# Patient Record
Sex: MALE | Race: WHITE | NOT HISPANIC OR LATINO | Employment: OTHER | URBAN - METROPOLITAN AREA
[De-identification: names, ages, dates, MRNs, and addresses within clinical notes are randomized per-mention and may not be internally consistent; named-entity substitution may affect disease eponyms.]

---

## 2017-10-23 ENCOUNTER — HISTORICAL (OUTPATIENT)
Dept: ADMINISTRATIVE | Facility: HOSPITAL | Age: 62
End: 2017-10-23

## 2017-11-02 ENCOUNTER — HISTORICAL (OUTPATIENT)
Dept: RADIOLOGY | Facility: HOSPITAL | Age: 62
End: 2017-11-02

## 2017-11-13 ENCOUNTER — HISTORICAL (OUTPATIENT)
Dept: PREADMISSION TESTING | Facility: HOSPITAL | Age: 62
End: 2017-11-13

## 2017-11-13 LAB
ABS NEUT (OLG): 8.21 X10(3)/MCL (ref 2.1–9.2)
ANISOCYTOSIS BLD QL SMEAR: 1
BUN SERPL-MCNC: 16 MG/DL (ref 7–18)
CALCIUM SERPL-MCNC: 9.6 MG/DL (ref 8.5–10.1)
CHLORIDE SERPL-SCNC: 100 MMOL/L (ref 98–107)
CO2 SERPL-SCNC: 27 MMOL/L (ref 21–32)
CREAT SERPL-MCNC: 0.8 MG/DL (ref 0.7–1.3)
EOSINOPHIL NFR BLD MANUAL: 1 % (ref 0–8)
ERYTHROCYTE [DISTWIDTH] IN BLOOD BY AUTOMATED COUNT: 15.6 % (ref 11.5–17)
GLUCOSE SERPL-MCNC: 85 MG/DL (ref 74–106)
HCT VFR BLD AUTO: 43.2 % (ref 42–52)
HGB BLD-MCNC: 13.6 GM/DL (ref 14–18)
LYMPHOCYTES NFR BLD MANUAL: 20 % (ref 13–40)
LYMPHOCYTES NFR BLD MANUAL: 21 %
MACROCYTES BLD QL SMEAR: 1
MCH RBC QN AUTO: 28.9 PG (ref 27–31)
MCHC RBC AUTO-ENTMCNC: 31.5 GM/DL (ref 33–36)
MCV RBC AUTO: 91.7 FL (ref 80–94)
MONOCYTES NFR BLD MANUAL: 10 % (ref 2–11)
NEUTROPHILS NFR BLD MANUAL: 48 % (ref 47–80)
PLATELET # BLD AUTO: 291 X10(3)/MCL (ref 130–400)
PLATELET # BLD EST: NORMAL 10*3/UL
PMV BLD AUTO: 12.4 FL (ref 7.4–10.4)
POTASSIUM SERPL-SCNC: 4.3 MMOL/L (ref 3.5–5.1)
RBC # BLD AUTO: 4.71 X10(6)/MCL (ref 4.7–6.1)
SODIUM SERPL-SCNC: 138 MMOL/L (ref 136–145)
WBC # SPEC AUTO: 17.3 X10(3)/MCL (ref 4.5–11.5)

## 2018-02-26 ENCOUNTER — HISTORICAL (OUTPATIENT)
Dept: PREADMISSION TESTING | Facility: HOSPITAL | Age: 63
End: 2018-02-26

## 2018-02-26 ENCOUNTER — HISTORICAL (OUTPATIENT)
Dept: LAB | Facility: HOSPITAL | Age: 63
End: 2018-02-26

## 2018-02-26 LAB
ABS NEUT (OLG): 5.12 X10(3)/MCL (ref 2.1–9.2)
BASOPHILS # BLD AUTO: 0.1 X10(3)/MCL (ref 0–0.2)
BASOPHILS NFR BLD AUTO: 1 %
CRP SERPL HS-MCNC: 4.9 MG/L (ref 0–3)
EOSINOPHIL # BLD AUTO: 0.3 X10(3)/MCL (ref 0–0.9)
EOSINOPHIL NFR BLD AUTO: 3 %
ERYTHROCYTE [DISTWIDTH] IN BLOOD BY AUTOMATED COUNT: 14.6 % (ref 11.5–17)
ERYTHROCYTE [SEDIMENTATION RATE] IN BLOOD: 13 MM/HR (ref 0–15)
HCT VFR BLD AUTO: 42.4 % (ref 42–52)
HGB BLD-MCNC: 13.2 GM/DL (ref 14–18)
LYMPHOCYTES # BLD AUTO: 5 X10(3)/MCL (ref 0.6–4.6)
LYMPHOCYTES NFR BLD AUTO: 43 %
MCH RBC QN AUTO: 31 PG (ref 27–31)
MCHC RBC AUTO-ENTMCNC: 31.1 GM/DL (ref 33–36)
MCV RBC AUTO: 99.5 FL (ref 80–94)
MONOCYTES # BLD AUTO: 1.1 X10(3)/MCL (ref 0.1–1.3)
MONOCYTES NFR BLD AUTO: 9 %
NEUTROPHILS # BLD AUTO: 5.12 X10(3)/MCL (ref 1.4–7.9)
NEUTROPHILS NFR BLD AUTO: 44 %
PLATELET # BLD AUTO: 348 X10(3)/MCL (ref 130–400)
PMV BLD AUTO: 10.6 FL (ref 9.4–12.4)
RBC # BLD AUTO: 4.26 X10(6)/MCL (ref 4.7–6.1)
WBC # SPEC AUTO: 11.6 X10(3)/MCL (ref 4.5–11.5)

## 2018-04-02 ENCOUNTER — HISTORICAL (OUTPATIENT)
Dept: ADMINISTRATIVE | Facility: HOSPITAL | Age: 63
End: 2018-04-02

## 2018-04-04 ENCOUNTER — HISTORICAL (OUTPATIENT)
Dept: RADIOLOGY | Facility: HOSPITAL | Age: 63
End: 2018-04-04

## 2018-07-19 ENCOUNTER — HISTORICAL (OUTPATIENT)
Dept: ADMINISTRATIVE | Facility: HOSPITAL | Age: 63
End: 2018-07-19

## 2019-03-28 ENCOUNTER — HISTORICAL (OUTPATIENT)
Dept: ADMINISTRATIVE | Facility: HOSPITAL | Age: 64
End: 2019-03-28

## 2019-04-17 ENCOUNTER — HISTORICAL (OUTPATIENT)
Dept: RADIOLOGY | Facility: HOSPITAL | Age: 64
End: 2019-04-17

## 2019-07-10 ENCOUNTER — HISTORICAL (OUTPATIENT)
Dept: ADMINISTRATIVE | Facility: HOSPITAL | Age: 64
End: 2019-07-10

## 2019-08-09 ENCOUNTER — HISTORICAL (OUTPATIENT)
Dept: ADMINISTRATIVE | Facility: HOSPITAL | Age: 64
End: 2019-08-09

## 2019-09-23 ENCOUNTER — HISTORICAL (OUTPATIENT)
Dept: ADMINISTRATIVE | Facility: HOSPITAL | Age: 64
End: 2019-09-23

## 2022-04-10 ENCOUNTER — HISTORICAL (OUTPATIENT)
Dept: ADMINISTRATIVE | Facility: HOSPITAL | Age: 67
End: 2022-04-10

## 2022-04-28 VITALS
BODY MASS INDEX: 41.34 KG/M2 | WEIGHT: 224.63 LBS | SYSTOLIC BLOOD PRESSURE: 133 MMHG | DIASTOLIC BLOOD PRESSURE: 77 MMHG | HEIGHT: 62 IN

## 2022-05-04 NOTE — HISTORICAL [facility]
This is a historical note converted from [place]. Formatting and pictures may have been removed.  Please reference [place] for original formatting and attached multimedia. Chief Complaint  6 week s/p Rt Rev Total Shoulder Arthroscopy, Sx-5/28/19--Gl--8/26/19. ?Pt states his shoulder is doing better since the last appt  History of Present Illness  5/20/2019: Right reverse total shoulder arthroplasty  ?   He returns today. His pains under good control. 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  HT:?157?cm? WT:?101.9?kg? BMI:?41.34?  Incision healed. ?Forward flexion 160 degrees, abduction?90 degrees, external rotation 20 degrees  Assessment/Plan  S/p reverse total shoulder arthroplasty?Z96.619  Doing well status post above.? He needs to be mindful of his weightbearing. ?Okay for range of motion.? I will see him back in 6 weeks?for range of motion check and a D MAGAN score  Ordered:  Clinic Follow up, *Est. 08/21/19 3:00:00 CDT, Order for future visit, S/p reverse total shoulder arthroplasty, [facility]  Post-Op follow-up visit [id number] PC, S/p reverse total shoulder arthroplasty, [facility], 07/10/19 8:58:00 CDT  XR Shoulder Right Minimum 2 Views, Routine, 07/10/19 8:41:00 CDT, Other (please specify), None, Patient Bed, Patient Has IV?, Patient on Oxygen?, Rad Type, S/p reverse total shoulder arthroplasty, Not Scheduled, 07/10/19 8:41:00 CDT  ?  Referrals  Clinic Follow up, *Est. 08/21/19 3:00:00 CDT, Order for future visit, S/p reverse total shoulder arthroplasty, [facility]   Problem List/Past Medical History  Ongoing  Apnea, sleep  DJD (degenerative joint disease)  Expected difficult intubation  History of methicillin resistant Staphylococcus aureus infection  Hypothyroidism  Neuropathy  Obesity  S/p reverse total shoulder arthroplasty  Sinusitis  Spinal stenosis  Thyroid disease  Under care of pain management specialist  Historical  Home oxygen  supply  Kidney stone  Leaky pulmonary heart valve  Low oxygen saturation  Procedure/Surgical History  Replacement of Right Shoulder Joint with Reverse Ball and Socket Synthetic Substitute, Open Approach (05/28/2019)  Total Reverse Shoulder (Right) (05/28/2019)  Injection procedure for shoulder arthrography or enhanced CT/MRI shoulder arthrography (04/17/2019)  gastric sleeve (03/20/2017)  Application of external fixator device, other (12/05/2014)  XLIF + removal of lower rib + posterior percutaneous screws w/bar & sonal (12/05/2014)  XLIF + removal of lower rib + posterior percutaneous screws w/bar & sonal (12/05/2014)  INSERTION OF INTERBODY SPINAL FUSION DEVICE (12/05/2014)  XLIF + removal of lower rib + posterior percutaneous screws w/bar & sonal (12/05/2014)  Xtreme Lateral Interbody Fusion W/Prone Instrumentation (Left) (12/05/2014)  Splenectomy (1969)  Hernia repair  hip arthroproplasty  History of back surgery  Lithotripsy  multiple lower back surgeries  orif femur  spinal fusion  Total hip replacement  total knee arthroplasty  Total knee replacement   Medications  acetaminophen-oxycodone 325 mg-10 mg oral tablet  aspirin 81 mg oral Delayed Release (EC) tablet, 81 mg= 1 tab(s), Oral, BID  DIAZepam 5 mg oral tablet  diclofenac 1% topical gel, 2 gm, TOP, QID  gabapentin 400 mg oral capsule, 400 mg= 1 cap(s), Oral, QID  levothyroxine 200 mcg (0.2 mg) oral tablet, 200 mcg= 1 tab(s), Oral, Daily  methadone 10 mg oral tablet, 10 mg= 1 tab(s), Oral, BID  mometasone 0.1% topical ointment, 1 manfred, TOP, Daily  traMADol 50 mg oral tablet  Allergies  morphine?(nausea, itching)  vancomycin?(Agitated)  Social History  Abuse/Neglect  No, No, Yes, 07/10/2019  Alcohol - Denies Alcohol Use, 12/05/2014  Current, 10/23/2017  Employment/School  Unemployed, Work/School description: SSI., 05/16/2019  Exercise  Home/Environment  Lives with Spouse. Living situation: Home with assistance. Home equipment: Walker/Cane.,  05/16/2019  Nutrition/Health  Regular, 05/16/2019  Sexual  Sexually active: Yes., 05/16/2019  Substance Use - Denies Substance Abuse, 12/05/2014  Drug use interferes with work/home: No. Household substance abuse concerns: No., 06/12/2019  Tobacco - Low Risk, 12/05/2014  Former smoker, quit more than 30 days ago, No, 07/10/2019  Family History  DVT - Deep vein thrombosis: Father.  Health Maintenance  Health Maintenance  ???Pending?(in the next year)  ??? ??OverDue  ??? ? ? ?Diabetes Screening due??and every?  ??? ? ? ?Alcohol Misuse Screening due??01/01/19??and every 1??year(s)  ??? ? ? ?Depression Screening due??04/09/19??and every 1??year(s)  ??? ??Due?  ??? ? ? ?ADL Screening due??07/10/19??and every 1??year(s)  ??? ? ? ?COPD Maintenance-Spirometry due??07/10/19??and every?  ??? ? ? ?Colorectal Screening due??07/10/19??and every?  ??? ? ? ?Lipid Screening due??07/10/19??and every?  ??? ? ? ?Tetanus Vaccine due??07/10/19??and every 10??year(s)  ??? ? ? ?Zoster Vaccine due??07/10/19??and every 100??year(s)  ??? ??Due In Future?  ??? ? ? ?Obesity Screening not due until??01/01/20??and every 1??year(s)  ??? ? ? ?Aspirin Therapy for CVD Prevention not due until??05/30/20??and every 1??year(s)  ??? ? ? ?Blood Pressure Screening not due until??06/11/20??and every 1??year(s)  ??? ? ? ?Body Mass Index Check not due until??06/11/20??and every 1??year(s)  ???Satisfied?(in the past 1 year)  ??? ??Satisfied?  ??? ? ? ?Aspirin Therapy for CVD Prevention on??05/30/19.??Satisfied by Hernando GALLO MD, Slick ESPINAL  ??? ? ? ?Blood Pressure Screening on??07/10/19.??Satisfied by Clarissa Weiner  ??? ? ? ?Body Mass Index Check on??07/10/19.??Satisfied by Clarissa Weiner  ??? ? ? ?COPD Maintenance-Spirometry on??05/30/19.??Satisfied by Gabriela Parker RN  ??? ? ? ?Diabetes Screening on??05/30/19.??Satisfied by Lucho Alvarado  ??? ? ? ?Obesity Screening on??07/10/19.??Satisfied by Clarissa Weiner  ?  Diagnostic  Results  Shoulder radiographs show appropriate implant position

## 2022-05-04 NOTE — HISTORICAL [facility]
This is a historical note converted from [place]. Formatting and pictures may have been removed.  Please reference [place] for original formatting and attached multimedia. Chief Complaint  right shoulder, wants to discuss surgery  History of Present Illness  Patient returns today for repeat exam. ?Patient?continues to have some right shoulder pain. ?He states he would like to talk about his neck pain. ?Patient states he has had?5 lumbar surgeries?in the past. ?He has had 4 lumbar surgeries by , 1 by Dr. Nicole. ?He states more recently the last 3-4 months he has noticed some increasing pain in both shoulders as well as intermittent numbness and tingling?from his neck down to his shoulders. ?He does have a history of arthritis. ?He denies any specific surgery on his cervical spine. ?He denies any trauma.? He would like his cervical spine evaluated today.  Physical Exam  Vitals & Measurements  HR:?64(Peripheral)? BP:?157/86?  HT:?161?cm? WT:?113.9?kg?  Examination of the cervical spine he is tender to palpation about the paraspinal muscles on the left and right side he is nontender over the specific spinous processes. ?There is no step-off or crepitus. ?He is able to flex 30? extend 10? and rotate 25?. ?He has 5 out of 5 strength from C5 through T1 states intact light touch negative Hoffmans 2+ reflexes at C5 and?C7 bilaterally.? He is neurovascular intact distally. ?X-rays 3 views of the cervical spine demonstrate severe degenerative changes?at the lower cervical spine levels?with loss of joint space.  Assessment/Plan  1.?Osteoarthritis of right shoulder  ? At this time we discussed his physical exam and x-ray findings. ?Patient has intermittent radiculopathy to both shoulders as well as degenerative changes. ?I am concerned for a herniated disc and?stenosis. ?We will proceed with a CT scan of the cervical spine. ?He states he is not able to obtain an MRI?given the length of an MRI and unable to lay down.?  Initially we will start with a CT scan for further evaluation of his cervical spine, I would like to see him back later this week for his results.  Ordered:  Clinic Follow up, *Est. 04/09/18 3:00:00 CDT, Order for future visit, Osteoarthritis of right shoulder  Calcific tendinitis  Shoulder impingement  Cervical spinal stenosis  Cervical spondylosis, [facility] [place]  CT Cervical Spine W/O Contrast, Routine, 04/04/18 8:00:00 CDT, Other (please specify), m19.011 Primary osteoarthritis, right shoulder, None, Patient Bed, Patient Has IV?, Patient on Oxygen?, ct c spine, Rad Type, Order for future visit, Osteoarthritis of right shoulder  Calcific te...  Office/Outpatient Visit Level 4 Established [id number] PC, Osteoarthritis of right shoulder  Calcific tendinitis  Shoulder impingement  Cervical spinal stenosis  Cervical spondylosis, [facility] [place], 04/02/18 8:51:00 CDT  ?  2.?Calcific tendinitis  Ordered:  Clinic Follow up, *Est. 04/09/18 3:00:00 CDT, Order for future visit, Osteoarthritis of right shoulder  Calcific tendinitis  Shoulder impingement  Cervical spinal stenosis  Cervical spondylosis, [facility] [place]  CT Cervical Spine W/O Contrast, Routine, 04/04/18 8:00:00 CDT, Other (please specify), m19.011 Primary osteoarthritis, right shoulder, None, Patient Bed, Patient Has IV?, Patient on Oxygen?, ct c spine, Rad Type, Order for future visit, Osteoarthritis of right shoulder  Calcific te...  Office/Outpatient Visit Level 4 Established [id number] PC, Osteoarthritis of right shoulder  Calcific tendinitis  Shoulder impingement  Cervical spinal stenosis  Cervical spondylosis, [facility] [place], 04/02/18 8:51:00 CDT  ?  3.?Shoulder impingement  Ordered:  Clinic Follow up, *Est. 04/09/18 3:00:00 CDT, Order for future visit, Osteoarthritis of right shoulder  Calcific tendinitis  Shoulder impingement  Cervical spinal stenosis  Cervical spondylosis, [facility]  CT Cervical Spine W/O Contrast,  Routine, 04/04/18 8:00:00 CDT, Other (please specify), m19.011 Primary osteoarthritis, right shoulder, None, Patient Bed, Patient Has IV?, Patient on Oxygen?, ct c spine, Rad Type, Order for future visit, Osteoarthritis of right shoulder  Calcific te...  Office/Outpatient Visit Level 4 Established [id number] PC, Osteoarthritis of right shoulder  Calcific tendinitis  Shoulder impingement  Cervical spinal stenosis  Cervical spondylosis, [facility] [place], 04/02/18 8:51:00 CDT  ?  4.?Cervical spinal stenosis  Ordered:  Clinic Follow up, *Est. 04/09/18 3:00:00 CDT, Order for future visit, Osteoarthritis of right shoulder  Calcific tendinitis  Shoulder impingement  Cervical spinal stenosis  Cervical spondylosis, [facility] [place]  CT Cervical Spine W/O Contrast, Routine, 04/04/18 8:00:00 CDT, Other (please specify), m19.011 Primary osteoarthritis, right shoulder, None, Patient Bed, Patient Has IV?, Patient on Oxygen?, ct c spine, Rad Type, Order for future visit, Osteoarthritis of right shoulder  Calcific te...  Office/Outpatient Visit Level 4 Established [id number] PC, Osteoarthritis of right shoulder  Calcific tendinitis  Shoulder impingement  Cervical spinal stenosis  Cervical spondylosis, [facility] [place], 04/02/18 8:51:00 CDT  ?  5.?Cervical spondylosis  Ordered:  Clinic Follow up, *Est. 04/09/18 3:00:00 CDT, Order for future visit, Osteoarthritis of right shoulder  Calcific tendinitis  Shoulder impingement  Cervical spinal stenosis  Cervical spondylosis, [facility] [place]  CT Cervical Spine W/O Contrast, Routine, 04/04/18 8:00:00 CDT, Other (please specify), m19.011 Primary osteoarthritis, right shoulder, None, Patient Bed, Patient Has IV?, Patient on Oxygen?, ct c spine, Rad Type, Order for future visit, Osteoarthritis of right shoulder  Calcific te...  Office/Outpatient Visit Level 4 Established [id number] PC, Osteoarthritis of right shoulder  Calcific tendinitis  Shoulder impingement  Cervical  spinal stenosis  Cervical spondylosis, [facility], 04/02/18 8:51:00 CDT  ?  Cervicalgia  ?   Problem List/Past Medical History  Ongoing  Apnea, sleep  DJD (degenerative joint disease)  Expected difficult intubation  History of methicillin resistant Staphylococcus aureus infection  Hypothyroidism  Neuropathy  Obesity  Rotator cuff tear  Sinusitis  Spinal stenosis  Thyroid disease  Under care of pain management specialist  Historical  Home oxygen supply  Kidney stone  Leaky pulmonary heart valve  Low oxygen saturation  Procedure/Surgical History  gastric sleeve (03/20/2017), Application of external fixator device, other (12/05/2014), Excision of intervertebral disc (12/05/2014), Fusion or refusion of 4- 8 vertebrae (12/05/2014), INSERTION OF INTERBODY SPINAL FUSION DEVICE (12/05/2014), Lumbar and Lumbosacral Fusion of the Anterior Column, Anterior Technique (12/05/2014), Xtreme Lateral Interbody Fusion W/Prone Instrumentation (Left) (12/05/2014), Hernia repair, hip arthroproplasty, History of back surgery, Lithotripsy, multiple lower back surgeries, orif femur, spinal fusion, Spleen operation, Splenectomy, Total hip replacement, total knee arthroplasty, Total knee replacement..  Medications  gabapentin 400 mg oral capsule, 400 mg= 1 cap(s), Oral, QID  LEVOTHYROXINE 175 MCG TABLET, 175 mcg= 1 tab(s), Oral, Daily  NABUMETONE 750 MG TABLET, 750 mg= 1 tab(s), Oral, BID  OXYCODONE-ACETAMINOPHEN , 1 tab(s), Oral, TID, PRN  Pantoprazole 40 mg ORAL EC-Tablet, 40 mg= 1 tab(s), Oral, Daily  phentermine 37.5 mg oral capsule, 37.5 mg= 1 cap(s), Oral, Daily,? ?Not Taking, Completed Rx  Allergies  morphine?(nausea, itching)  vancomycin?(Agitated)  Social History  Alcohol - Denies Alcohol Use, 12/05/2014  Current, 10/25/2017  Substance Abuse - Denies Substance Abuse, 12/05/2014  Tobacco - Low Risk, 12/05/2014  Former smoker, 10/25/2017  Former smoker, Cigarettes, 12/05/2014  Family History  DVT - Deep vein  thrombosis: Father.

## 2022-05-04 NOTE — HISTORICAL [facility]
This is a historical note converted from [place]. Formatting and pictures may have been removed.  Please reference [place] for original formatting and attached multimedia. Chief Complaint  F/U RIGHT REVERSE TOTAL ARTHROPLASTY. ?SX:5/28/19. ?XRAY DONE ON TODAY. ?DOING GOOD..SB  History of Present Illness  5/20/2019: Right reverse total shoulder arthroplasty  ?   He returns today. He has rested over the last 6 weeks and his pains improved.  Review of Systems  Comprehensive review of system?was performed with no exceptions other than noted in the history of present illness  Physical Exam  Vitals & Measurements  BP:?133/77?  HT:?157?cm? WT:?101.9?kg? BMI:?41.34?  Gen: WN, WD, NAD  Card/Res: NL breathing, +distal pulses  Abdomen: ND  Shoulder Exam:??????????Right??????????Left  Skin:??????????????????????????????Normal???????Normal  AC joint tenderness:??????????None??????????None  Forward Flexion:?????????????150 ??????????180  Abduction:?????????????????????180??????????? 180  External Rotation:?????????????? 40??????????????80  Internal Rotation?????????????? 80 ???????????? 80  Supraspinatus stress test?????? Neg??????????Neg  Yu Impingement:?? ?????Neg ?????????? ?Neg  Neer Impingement:?????????????Neg??????????Neg  Apprehension:???????????????????? Neg??????????Neg  OBriens:????????????????????????????Neg????????? Neg  Speeds test:??????????????????????? Neg??????????Neg  Strength:  External Rotation:???????????????5/5???????????????5/5  Lift Off/belly press:????????????5/5???????????????5/5  ?   N-V status:?????????????????????????Intact??????????Intact  ?   C-spine: Normal ROM, NT  ?  ?  Assessment/Plan  S/p reverse total shoulder arthroplasty?Z96.619  ?Doing well status post above. ?Continue rehab. ?I will see him back in May 2020?with radiographs of his right shoulder and a D MAGAN score  Ordered:  Clinic Follow up, *Est. 06/23/20 3:00:00 CDT, Order for future visit, S/p reverse total shoulder arthroplasty,  [facility]  Office/Outpatient Visit Level 3 Established [id number] PC, S/p reverse total shoulder arthroplasty, [facility], 09/23/19 8:18:00 CDT  XR Shoulder Right Minimum 2 Views, Routine, 09/23/19 7:45:00 CDT, Follow Up Trauma, None, Patient Bed, Patient Has IV?, Patient on Oxygen?, Rad Type, S/p reverse total shoulder arthroplasty, Not Scheduled, 09/23/19 7:45:00 CDT  ?  Referrals  Clinic Follow up, *Est. 06/23/20 3:00:00 CDT, Order for future visit, S/p reverse total shoulder arthroplasty, [facility]   Problem List/Past Medical History  Ongoing  Apnea, sleep  DJD (degenerative joint disease)  Expected difficult intubation  History of methicillin resistant Staphylococcus aureus infection  Hypothyroidism  Neuropathy  Obesity  S/p reverse total shoulder arthroplasty  Sinusitis  Spinal stenosis  Thyroid disease  Under care of pain management specialist  Historical  Home oxygen supply  Kidney stone  Leaky pulmonary heart valve  Low oxygen saturation  Procedure/Surgical History  Replacement of Right Shoulder Joint with Reverse Ball and Socket Synthetic Substitute, Open Approach (05/28/2019)  Total Reverse Shoulder (Right) (05/28/2019)  Injection procedure for shoulder arthrography or enhanced CT/MRI shoulder arthrography (04/17/2019)  gastric sleeve (03/20/2017)  Application of external fixator device, other (12/05/2014)  XLIF + removal of lower rib + posterior percutaneous screws w/bar & sonal (12/05/2014)  XLIF + removal of lower rib + posterior percutaneous screws w/bar & sonal (12/05/2014)  INSERTION OF INTERBODY SPINAL FUSION DEVICE (12/05/2014)  XLIF + removal of lower rib + posterior percutaneous screws w/bar & sonal (12/05/2014)  Xtreme Lateral Interbody Fusion W/Prone Instrumentation (Left) (12/05/2014)  Splenectomy (1969)  Hernia repair  hip arthroproplasty  History of back surgery  Lithotripsy  multiple lower back surgeries  orif femur  spinal fusion  Total hip replacement  total knee  arthroplasty  Total knee replacement   Medications  acetaminophen-oxycodone 325 mg-10 mg oral tablet  aspirin 81 mg oral Delayed Release (EC) tablet, 81 mg= 1 tab(s), Oral, BID  DIAZepam 5 mg oral tablet  diclofenac 1% topical gel, 2 gm, TOP, QID  gabapentin 400 mg oral capsule, 400 mg= 1 cap(s), Oral, QID  levothyroxine 200 mcg (0.2 mg) oral tablet, 200 mcg= 1 tab(s), Oral, Daily  methadone 10 mg oral tablet, 10 mg= 1 tab(s), Oral, BID  mometasone 0.1% topical ointment, 1 manfred, TOP, Daily  traMADol 50 mg oral tablet  Allergies  morphine?(nausea, itching)  vancomycin?(Agitated)  Social History  Abuse/Neglect  No, 09/23/2019  Alcohol - Denies Alcohol Use, 12/05/2014  Current, 10/23/2017  Employment/School  Unemployed, Work/School description: SSI., 05/16/2019  Exercise  Home/Environment  Lives with Spouse. Living situation: Home with assistance. Home equipment: Walker/Cane., 05/16/2019  Nutrition/Health  Regular, 05/16/2019  Sexual  Sexually active: Yes., 05/16/2019  Substance Use - Denies Substance Abuse, 12/05/2014  Drug use interferes with work/home: No. Household substance abuse concerns: No., 06/12/2019  Tobacco - Low Risk, 12/05/2014  Never (less than 100 in lifetime), N/A, 09/23/2019  Family History  DVT - Deep vein thrombosis: Father.  Health Maintenance  Health Maintenance  ???Pending?(in the next year)  ??? ??OverDue  ??? ? ? ?Diabetes Screening due??and every?  ??? ? ? ?Alcohol Misuse Screening due??01/01/19??and every 1??year(s)  ??? ? ? ?Depression Screening due??04/09/19??and every 1??year(s)  ??? ??Due?  ??? ? ? ?ADL Screening due??09/23/19??and every 1??year(s)  ??? ? ? ?COPD Maintenance-Spirometry due??09/23/19??and every?  ??? ? ? ?Colorectal Screening due??09/23/19??and every?  ??? ? ? ?Influenza Vaccine due??09/23/19??and every?  ??? ? ? ?Lipid Screening due??09/23/19??and every?  ??? ? ? ?Tetanus Vaccine due??09/23/19??and every 10??year(s)  ??? ? ? ?Zoster Vaccine due??09/23/19??and every  100??year(s)  ??? ??Due In Future?  ??? ? ? ?Obesity Screening not due until??01/01/20??and every 1??year(s)  ??? ? ? ?Aspirin Therapy for CVD Prevention not due until??05/30/20??and every 1??year(s)  ??? ? ? ?Blood Pressure Screening not due until??08/08/20??and every 1??year(s)  ??? ? ? ?Body Mass Index Check not due until??08/08/20??and every 1??year(s)  ???Satisfied?(in the past 1 year)  ??? ??Satisfied?  ??? ? ? ?Aspirin Therapy for CVD Prevention on??05/30/19.??Satisfied by Hernando GALLO MD, Slick ESPINAL  ??? ? ? ?Blood Pressure Screening on??09/23/19.??Satisfied by Paradise Modi  ??? ? ? ?Body Mass Index Check on??09/23/19.??Satisfied by Paradise Modi  ??? ? ? ?COPD Maintenance-Spirometry on??05/30/19.??Satisfied by Keith DOUGLAS, Gabriela SLATER  ??? ? ? ?Diabetes Screening on??05/30/19.??Satisfied by Lucho Alvarado  ??? ? ? ?Obesity Screening on??09/23/19.??Satisfied by Paradise Modi.  ?

## 2022-05-04 NOTE — HISTORICAL [facility]
This is a historical note converted from [place]illo. Formatting and pictures may have been removed.  Please reference [place]illo for original formatting and attached multimedia. Chief Complaint  Right shoulder pain. seen Jan. 2016  History of Present Illness  Patient comes in today complaining of right shoulder pain. ?Patient states he has been having pain in his right shoulder?with overhead activities for the last?3+ months.? He states initial injury was back in January 2016.? He denies any radiculopathy numbness or tingling he has pain especially with overhead activities and any type of lifting not relieved with rest of medication. ?He has had a history of therapy in the past without relief.  Physical Exam  Vitals & Measurements  HR:?70?(Peripheral)? BP:?156/88?  HT:?161?cm? HT:?161?cm? WT:?113.9?kg? WT:?113.9?kg? BMI:?43.94?  Right upper extremity compartments soft and warm. ?Skin is intact. ?There is no signs or symptoms of DVT or infection. ?He is point tender along the anterolateral aspect of the right shoulder positive Yu positive empty can sign negative drop arm. ?He is able to forward flex 95? with pain and discomfort?he is also tender along the proximal biceps tendon. ?There is a slight grind?about his glenohumeral joint.? There is negative apprehension negative sulcus. ?He is neurovascular intact distally. ?X-rays 3 views right shoulder demonstrate some arthritic changes and calcific tendinitis.  Assessment/Plan  1.?Calcific tendonitis of right shoulder  ? At this time we discussed his physical exam and x-ray findings. ?We have discussed his previous x-rays as well.? She remains to be symptomatic about his right shoulder. ?We have discussed additional imaging?for his right shoulder.? Patient states he is not able to lay down?for an MRI that long,?he is not interested in driving as well for a stand up, or sitting MRI. ?We have discussed?a CT of his right shoulder for further evaluation, he would like to  proceed with this. ?We will set this up at his convenience.? I would like to see him back next week for his results.  2.?Impingement syndrome of right shoulder  3.?Osteoarthritis of right shoulder  Pain in right shoulder   Problem List/Past Medical History  Ongoing  Hypothyroidism  Historical  Procedure/Surgical History  gastric sleeve  Hernia repair  hip arthroproplasty  multiple lower back surgeries  orif femur  Spleen operation  total knee arthroplasty  Medications  LEVOTHYROXINE 175 MCG TABLET  NABUMETONE 750 MG TABLET  OXYCODONE-ACETAMINOPHEN   Allergies  No Known Medication Allergies  Social History  Alcohol - 10/23/2017  Current  Tobacco - 10/23/2017  Former smoker

## 2022-05-04 NOTE — HISTORICAL [facility]
This is a historical note converted from [place]. Formatting and pictures may have been removed.  Please reference Portillo for original formatting and attached multimedia. Chief Complaint  2 month s/p right reverse total rthroplasty. ?sx: 5/28/19. ?xray done today. ?pt. is ambulating with rolling walker today. pt. is talking medication for ?the pain. ?pain today 8/10...sb  History of Present Illness  5/20/2019: Right reverse total shoulder arthroplasty  ?   He returns today.? He said continued pain over his acromion down his deltoid. ?The pain occasionally shoots?towards the neck. ?He notes it worse with movement.? It does not bother him at rest.  Physical Exam  Vitals & Measurements  BP:?133/77?  HT:?157?cm? WT:?101.9?kg? BMI:?41.34?  He is tender over his acromion.? He has good passive range of motion particularly in abduction with 90degrees. ?Active range of motion is painful form and abduction. ?He is got good motion forward?to about 150 degrees.? Distally his neurovascular intact. ?Incision healed. ?No signs of infection  Assessment/Plan  1.?S/p reverse total shoulder arthroplasty?Z96.619  Suspect he is developed a acromial stress fracture. ?We will place him in?a sling for the next 3 weeks. ?He can then can come out of the sling for gentle range of motion exercises.??I will see him back in 6 weeks with radiographs of the right shoulder. ?If his pain persist we will consider a CT scan  Ordered:  Clinic Follow up, *Est. 09/20/19 3:00:00 CDT, Order for future visit, S/p reverse total shoulder arthroplasty, [facility]  Clinic Follow up, *Est. 09/20/19 3:00:00 CDT, Order for future visit, S/p reverse total shoulder arthroplasty, [facility]  Post-Op follow-up visit [id number] PC, S/p reverse total shoulder arthroplasty, [facility], 08/09/19 10:59:00 CDT  Post-Op follow-up visit [id number] PC, S/p reverse total shoulder arthroplasty, [facility], 08/09/19 11:43:00 CDT  ?  Orders:  XR Shoulder Right  Minimum 2 Views, Routine, 08/09/19 10:44:00 CDT, Pain, None, Patient Bed, Patient Has IV?, Patient on Oxygen?, Rad Type, Right shoulder pain, Not Scheduled, 08/09/19 10:44:00 CDT  Referrals  Clinic Follow up, *Est. 09/20/19 3:00:00 CDT, Order for future visit, S/p reverse total shoulder arthroplasty, [facility]  Clinic Follow up, *Est. 09/20/19 3:00:00 CDT, Order for future visit, S/p reverse total shoulder arthroplasty, [facility]   Problem List/Past Medical History  Ongoing  Apnea, sleep  DJD (degenerative joint disease)  Expected difficult intubation  History of methicillin resistant Staphylococcus aureus infection  Hypothyroidism  Neuropathy  Obesity  S/p reverse total shoulder arthroplasty  Sinusitis  Spinal stenosis  Thyroid disease  Under care of pain management specialist  Historical  Home oxygen supply  Kidney stone  Leaky pulmonary heart valve  Low oxygen saturation  Procedure/Surgical History  Replacement of Right Shoulder Joint with Reverse Ball and Socket Synthetic Substitute, Open Approach (05/28/2019)  Total Reverse Shoulder (Right) (05/28/2019)  Injection procedure for shoulder arthrography or enhanced CT/MRI shoulder arthrography (04/17/2019)  gastric sleeve (03/20/2017)  Application of external fixator device, other (12/05/2014)  XLIF + removal of lower rib + posterior percutaneous screws w/bar & sonal (12/05/2014)  XLIF + removal of lower rib + posterior percutaneous screws w/bar & sonal (12/05/2014)  INSERTION OF INTERBODY SPINAL FUSION DEVICE (12/05/2014)  XLIF + removal of lower rib + posterior percutaneous screws w/bar & sonal (12/05/2014)  Xtreme Lateral Interbody Fusion W/Prone Instrumentation (Left) (12/05/2014)  Splenectomy (1969)  Hernia repair  hip arthroproplasty  History of back surgery  Lithotripsy  multiple lower back surgeries  orif femur  spinal fusion  Total hip replacement  total knee arthroplasty  Total knee replacement   Medications  acetaminophen-oxycodone 325 mg-10 mg oral  tablet  aspirin 81 mg oral Delayed Release (EC) tablet, 81 mg= 1 tab(s), Oral, BID  DIAZepam 5 mg oral tablet  diclofenac 1% topical gel, 2 gm, TOP, QID  gabapentin 400 mg oral capsule, 400 mg= 1 cap(s), Oral, QID  levothyroxine 200 mcg (0.2 mg) oral tablet, 200 mcg= 1 tab(s), Oral, Daily  methadone 10 mg oral tablet, 10 mg= 1 tab(s), Oral, BID  mometasone 0.1% topical ointment, 1 manfred, TOP, Daily  traMADol 50 mg oral tablet  Allergies  morphine?(nausea, itching)  vancomycin?(Agitated)  Social History  Abuse/Neglect  No, No, Yes, 08/09/2019  Alcohol - Denies Alcohol Use, 12/05/2014  Current, 10/23/2017  Employment/School  Unemployed, Work/School description: SSI., 05/16/2019  Exercise  Home/Environment  Lives with Spouse. Living situation: Home with assistance. Home equipment: Walker/Cane., 05/16/2019  Nutrition/Health  Regular, 05/16/2019  Sexual  Sexually active: Yes., 05/16/2019  Substance Use - Denies Substance Abuse, 12/05/2014  Drug use interferes with work/home: No. Household substance abuse concerns: No., 06/12/2019  Tobacco - Low Risk, 12/05/2014  Never (less than 100 in lifetime), N/A, 08/09/2019  Family History  DVT - Deep vein thrombosis: Father.  Health Maintenance  Health Maintenance  ???Pending?(in the next year)  ??? ??OverDue  ??? ? ? ?Diabetes Screening due??and every?  ??? ? ? ?Alcohol Misuse Screening due??01/01/19??and every 1??year(s)  ??? ? ? ?Depression Screening due??04/09/19??and every 1??year(s)  ??? ??Due?  ??? ? ? ?ADL Screening due??08/09/19??and every 1??year(s)  ??? ? ? ?COPD Maintenance-Spirometry due??08/09/19??and every?  ??? ? ? ?Colorectal Screening due??08/09/19??and every?  ??? ? ? ?Influenza Vaccine due??08/09/19??and every?  ??? ? ? ?Lipid Screening due??08/09/19??and every?  ??? ? ? ?Tetanus Vaccine due??08/09/19??and every 10??year(s)  ??? ? ? ?Zoster Vaccine due??08/09/19??and every 100??year(s)  ??? ??Due In Future?  ??? ? ? ?Obesity Screening not due until??01/01/20??and  every 1??year(s)  ??? ? ? ?Aspirin Therapy for CVD Prevention not due until??05/30/20??and every 1??year(s)  ??? ? ? ?Blood Pressure Screening not due until??07/30/20??and every 1??year(s)  ??? ? ? ?Body Mass Index Check not due until??07/30/20??and every 1??year(s)  ???Satisfied?(in the past 1 year)  ??? ??Satisfied?  ??? ? ? ?Aspirin Therapy for CVD Prevention on??05/30/19.??Satisfied by Hernando GALLO MD, Slick ESPINAL  ??? ? ? ?Blood Pressure Screening on??08/09/19.??Satisfied by Paradise Modi  ??? ? ? ?Body Mass Index Check on??08/09/19.??Satisfied by Paradise Modi  ??? ? ? ?COPD Maintenance-Spirometry on??05/30/19.??Satisfied by Gabriela Parker RN  ??? ? ? ?Diabetes Screening on??05/30/19.??Satisfied by Lucho Alvarado  ??? ? ? ?Obesity Screening on??08/09/19.??Satisfied by Paradise Modi  ?  Diagnostic Results  Shoulder radiographs show acromial stress fracture     [1]?Office Visit Note; Hernando GALLO MD, Slick ESPINAL 07/31/2019 09:05 CDT

## 2022-05-04 NOTE — HISTORICAL [facility]
This is a historical note converted from Portillo. Formatting and pictures may have been removed.  Please reference Portillo for original formatting and attached multimedia. Chief Complaint  Right shoulder pain. No injury. Pt wants to talk about possible surgery to remove bone spurs.  History of Present Illness  Patient returns today complaining of right shoulder pain. ?Patient continues to have pain and loss of motion of his right shoulder.? He would like to proceed with a shoulder arthroscopy though he does have significant?underlying arthritic changes of his right shoulder.? He has done very well with his more recent right hip replacement.? He continues to ambulate with his walker he denies any new trauma he denies other complaints.  Physical Exam  Vitals & Measurements  HR:?68(Peripheral)? BP:?162/101?  HT:?161?cm? WT:?108.86?kg? BMI:?42?  Right upper extremity compartment soft and warm. ?Skin is intact with no signs or symptoms of DVT or infection. ?Remains be tender along the anterior and lateral aspect of the right shoulder. ?There is grinding with?shoulder joint itself. ?There is no obvious swelling or erythema. ?He is able to forward flex and abduct 90 degrees with discomfort.? Positive Yu positive empty can sign.? Negative sulcus negative?OBriens. ?He is neurovascular intact distally.? X-rays 3 views right shoulder demonstrate significant subacromial impingement,?glenohumeral arthritis, near bone-on-bone.  Assessment/Plan  1.?Impingement syndrome of right shoulder  ? At this time we discussed his physical exam and x-ray findings. ?We have discussed the significant arthritis of the right shoulder,?arthritic changes.? We have discussed a shoulder?replacement. ?He would like to proceed with this. ?In the meantime he would like an injection today he and his right shoulder. ?He is done very well with this in the past. ?Under sterile technique he tolerated this very well to the?subacromial space of the right  shoulder.? This was 2 cc of dexamethasone 5 cc 1% lidocaine without.? We have discussed some shoulder range of motion and strengthening exercises. ?We have also discussed consultation?with 1 of my partners?for a shoulder replacement. ?We will set this up at his convenience.  Ordered:  dexamethasone, 8 mg, Intra-Articular, Once, first dose 03/28/19 14:00:00 CDT, stop date 03/28/19 14:00:00 CDT  Lidocaine inj., 5 mL, Intra-Articular, Once, first dose 03/28/19 13:48:00 CDT, stop date 03/28/19 13:48:00 CDT  asp/inj jnt/bursa, major [id number] PC  External Referral  Office/Outpatient Visit Level 3 Established [id number] PC  ?  2.?Calcific tendonitis  Ordered:  dexamethasone, 8 mg, Intra-Articular, Once, first dose 03/28/19 14:00:00 CDT, stop date 03/28/19 14:00:00 CDT  Lidocaine inj., 5 mL, Intra-Articular, Once, first dose 03/28/19 13:48:00 CDT, stop date 03/28/19 13:48:00 CDT  asp/inj jnt/bursa, major [id number] PC  External Referral  Office/Outpatient Visit Level 3 Established [id number] PC  ?  3.?Osteoarthritis of right shoulder  Ordered:  dexamethasone, 8 mg, Intra-Articular, Once, first dose 03/28/19 14:00:00 CDT, stop date 03/28/19 14:00:00 CDT  Lidocaine inj., 5 mL, Intra-Articular, Once, first dose 03/28/19 13:48:00 CDT, stop date 03/28/19 13:48:00 CDT  asp/inj jnt/bursa, major [id number] PC  External Referral  Office/Outpatient Visit Level 3 Established [id number] PC  ?   Problem List/Past Medical History  Ongoing  Apnea, sleep  DJD (degenerative joint disease)  Expected difficult intubation  History of methicillin resistant Staphylococcus aureus infection  Hypothyroidism  Neuropathy  Obesity  Rotator cuff tear  Sinusitis  Spinal stenosis  Thyroid disease  Under care of pain management specialist  Historical  Home oxygen supply  Kidney stone  Leaky pulmonary heart valve  Low oxygen saturation  Procedure/Surgical History  gastric sleeve (03/20/2017)  Application of external fixator device, other (12/05/2014)  XLIF + removal of lower rib +  posterior percutaneous screws w/bar & sonal (12/05/2014)  XLIF + removal of lower rib + posterior percutaneous screws w/bar & sonal (12/05/2014)  INSERTION OF INTERBODY SPINAL FUSION DEVICE (12/05/2014)  XLIF + removal of lower rib + posterior percutaneous screws w/bar & sonal (12/05/2014)  Xtreme Lateral Interbody Fusion W/Prone Instrumentation (Left) (12/05/2014)  Hernia repair  hip arthroproplasty  History of back surgery  Lithotripsy  multiple lower back surgeries  orif femur  spinal fusion  Spleen operation  Splenectomy  Total hip replacement  total knee arthroplasty  Total knee replacement   Medications  dexamethasone, 8 mg, Intra-Articular, Once  gabapentin 400 mg oral capsule, 400 mg= 1 cap(s), Oral, QID  LEVOTHYROXINE 175 MCG TABLET, 175 mcg= 1 tab(s), Oral, Daily  Lidocaine inj., 5 mL, Intra-Articular, Once  NABUMETONE 750 MG TABLET, 750 mg= 1 tab(s), Oral, BID,? ?Not Taking per Prescriber  OXYCODONE-ACETAMINOPHEN , 1 tab(s), Oral, TID, PRN  Pantoprazole 40 mg ORAL EC-Tablet, 40 mg= 1 tab(s), Oral, Daily,? ?Not Taking per Prescriber  phentermine 37.5 mg oral capsule, 37.5 mg= 1 cap(s), Oral, Daily,? ?Not Taking, Completed Rx  Allergies  morphine?(nausea, itching)  vancomycin?(Agitated)  Social History  Alcohol - Denies Alcohol Use, 12/05/2014  Current, 10/25/2017  Substance Abuse - Denies Substance Abuse, 12/05/2014  Tobacco - Low Risk, 12/05/2014  Former smoker, quit more than 30 days ago, No, 03/28/2019  Former smoker, 10/25/2017  Former smoker, Cigarettes, 12/05/2014  Family History  DVT - Deep vein thrombosis: Father.  Health Maintenance  Health Maintenance  ???Pending?(in the next year)  ??? ??OverDue  ??? ? ? ?Diabetes Screening due??and every?  ??? ??Due?  ??? ? ? ?ADL Screening due??03/28/19??and every 1??year(s)  ??? ? ? ?Alcohol Misuse Screening due??03/28/19??and every 1??year(s)  ??? ? ? ?Aspirin Therapy for CVD Prevention due??03/28/19??and every 1??year(s)  ??? ? ? ?Colorectal Screening  due??03/28/19??and every?  ??? ? ? ?Lipid Screening due??03/28/19??and every?  ??? ? ? ?Tetanus Vaccine due??03/28/19??and every 10??year(s)  ??? ? ? ?Zoster Vaccine due??03/28/19??and every 100??year(s)  ??? ??Due In Future?  ??? ? ? ?Depression Screening not due until??04/09/19??and every 1??year(s)  ??? ? ? ?Blood Pressure Screening not due until??07/19/19??and every 1??year(s)  ??? ? ? ?Body Mass Index Check not due until??07/19/19??and every 1??year(s)  ???Satisfied?(in the past 1 year)  ??? ??Satisfied?  ??? ? ? ?Blood Pressure Screening on??03/28/19.??Satisfied by Merary Hsieh  ??? ? ? ?Body Mass Index Check on??03/28/19.??Satisfied by Merary Hsieh  ??? ? ? ?Depression Screening on??04/09/18.??Satisfied by Geni Haynes  ??? ? ? ?Obesity Screening on??03/28/19.??Satisfied by Merary Hsieh  ?  ?

## 2022-05-04 NOTE — HISTORICAL [facility]
This is a historical note converted from Portillo. Formatting and pictures may have been removed.  Please reference Portillo for original formatting and attached multimedia. Chief Complaint  right hip pain. no injury. 5 weeks ago lifting his father and thinks he strained his hip.  History of Present Illness  Patient returns today for repeat exam. ?Patient complains of right hip pain. ?Patient states about 5 weeks ago he was lifting his father when he felt pain in his?right hip and buttock area. ?He does have a history of?lower back,?history of lumbar fusion.? He states now he is having pain?in his right thigh occasionally shooting down into his calf and foot.? He has been using his walker.? He denies any specific groin pain. ?He denies any other complaints. ?He denies any history of falling or specific trauma.? He has tried rest and medication without relief.  Physical Exam  Vitals & Measurements  HR:?68(Peripheral)? BP:?162/101?  HT:?161?cm? HT:?161?cm? WT:?108.86?kg? WT:?108.86?kg? BMI:?42?  Right lower extremities warm soft and warm. ?Skin is intact with no signs or symptoms of DVT or infection. ?Examination of the right leg?he is tender in the right buttock area and lower back. ?He is mildly tender as well?along the lateral aspect of the?right femur. ?He has no groin pain is [facility]. ?He is able to flex 100? external rotate 20? internal rotate 10?. ?There is no?obvious long track signs today he does walk with a slight antalgic gait.? Questionable straight leg raise. ?He is neurovascular intact distally. ?X-rays 2 views of the right hip?demonstrate severe bone-on-bone arthritis.  Assessment/Plan  1.?Osteoarthritis of right hip  ? At this time we discussed his physical exam and x-ray findings. ?We have discussed his pain is likely coming from 2 sources his lower back and right hip.? I believe his right hip pain?is more likely chronic in nature, he does have?significant arthritis. ?He has no obvious  groin pain today.? We have also discussed his likely possibility?of his back pain?given his radiculopathy to the foot.? He will follow-up with his neurosurgeon. ?In the meantime we have discussed anti-inflammatories and low impact activities. ?He will continue with his walker.??Patient understands to call or return sooner if there is any new problems or difficulties otherwise I would like to see him back in 3 months to see how he is progressing.  Ordered:  Clinic Follow up, *Est. 10/18/18 8:00:00 CDT, Order for future visit, Osteoarthritis of right hip, [facility]  Office/Outpatient Visit Level 3 Established [id number] PC, Osteoarthritis of right hip, [facility], 07/19/18 8:23:00 CDT  ?  Pain in right hip  ?   Problem List/Past Medical History  Ongoing  Apnea, sleep  DJD (degenerative joint disease)  Expected difficult intubation  History of methicillin resistant Staphylococcus aureus infection  Hypothyroidism  Neuropathy  Obesity  Rotator cuff tear  Sinusitis  Spinal stenosis  Thyroid disease  Under care of pain management specialist  Historical  Home oxygen supply  Kidney stone  Leaky pulmonary heart valve  Low oxygen saturation  Procedure/Surgical History  gastric sleeve (03/20/2017)  Application of external fixator device, other (12/05/2014)  XLIF + removal of lower rib + posterior percutaneous screws w/bar & sonal (12/05/2014)  XLIF + removal of lower rib + posterior percutaneous screws w/bar & sonal (12/05/2014)  INSERTION OF INTERBODY SPINAL FUSION DEVICE (12/05/2014)  XLIF + removal of lower rib + posterior percutaneous screws w/bar & sonal (12/05/2014)  Xtreme Lateral Interbody Fusion W/Prone Instrumentation (Left) (12/05/2014)  Hernia repair  hip arthroproplasty  History of back surgery  Lithotripsy  multiple lower back surgeries  orif femur  spinal fusion  Spleen operation  Splenectomy  Total hip replacement  total knee arthroplasty  Total knee replacement   Medications  gabapentin 400 mg oral  capsule, 400 mg= 1 cap(s), Oral, QID  LEVOTHYROXINE 175 MCG TABLET, 175 mcg= 1 tab(s), Oral, Daily  NABUMETONE 750 MG TABLET, 750 mg= 1 tab(s), Oral, BID  OXYCODONE-ACETAMINOPHEN , 1 tab(s), Oral, TID, PRN  Pantoprazole 40 mg ORAL EC-Tablet, 40 mg= 1 tab(s), Oral, Daily  phentermine 37.5 mg oral capsule, 37.5 mg= 1 cap(s), Oral, Daily,? ?Not Taking, Completed Rx  Allergies  morphine?(nausea, itching)  vancomycin?(Agitated)  Social History  Alcohol - Denies Alcohol Use, 12/05/2014  Current, 10/25/2017  Substance Abuse - Denies Substance Abuse, 12/05/2014  Tobacco - Low Risk, 12/05/2014  Former smoker, 10/25/2017  Former smoker, Cigarettes, 12/05/2014  Family History  DVT - Deep vein thrombosis: Father.  Health Maintenance  Health Maintenance  ???Pending?(in the next year)  ??? ??Due?  ??? ? ? ?Alcohol Misuse Screening due??07/20/18??and every 1??year(s)  ??? ? ? ?Aspirin Therapy for CVD Prevention due??07/20/18??and every 1??year(s)  ??? ? ? ?Colorectal Screening due??07/20/18??Variable frequency  ??? ? ? ?Lipid Screening due??07/20/18??Variable frequency  ??? ? ? ?Tetanus Vaccine due??07/20/18??and every 10??year(s)  ??? ? ? ?Zoster Vaccine due??07/20/18??and every 100??year(s)  ??? ??Due In Future?  ??? ? ? ?Influenza Vaccine not due until??10/02/18??and every 1??year(s)  ??? ? ? ?Depression Screening not due until??04/09/19??and every 1??year(s)  ??? ? ? ?Blood Pressure Screening not due until??07/19/19??and every 1??year(s)  ??? ? ? ?Body Mass Index Check not due until??07/19/19??and every 1??year(s)  ??? ? ? ?Obesity Screening not due until??07/19/19??and every 1??year(s)  ???Satisfied?(in the past 1 year)  ??? ??Satisfied?  ??? ? ? ?Blood Pressure Screening on??07/19/18.??Satisfied by Carol Ann Jeffries LPN  ??? ? ? ?Body Mass Index Check on??07/19/18.??Satisfied by Carol Ann Jeffries LPN  ??? ? ? ?Depression Screening on??04/09/18.??Satisfied by Geni Diane  ??? ? ? ?Diabetes Screening  on??11/13/17.??Satisfied by Sydnie Lyons  ??? ? ? ?Obesity Screening on??07/19/18.??Satisfied by Carol Ann Jeffries LPN  ?  ?

## 2024-03-26 ENCOUNTER — PATIENT MESSAGE (OUTPATIENT)
Dept: ADMINISTRATIVE | Facility: OTHER | Age: 69
End: 2024-03-26
Payer: MEDICARE

## 2024-03-26 ENCOUNTER — HOSPITAL ENCOUNTER (OUTPATIENT)
Dept: RADIOLOGY | Facility: HOSPITAL | Age: 69
Discharge: HOME | End: 2024-03-26
Attending: OTOLARYNGOLOGY
Payer: MEDICARE

## 2024-03-26 DIAGNOSIS — Z01.818 OTHER SPECIFIED PRE-OPERATIVE EXAMINATION: ICD-10-CM

## 2024-03-26 DIAGNOSIS — D37.05 NEOPLASM OF UNCERTAIN BEHAVIOR OF LIP, ORAL CAVITY, AND PHARYNX: Primary | ICD-10-CM

## 2024-03-26 DIAGNOSIS — Z79.01 LONG TERM (CURRENT) USE OF ANTICOAGULANTS: ICD-10-CM

## 2024-03-26 DIAGNOSIS — D37.01 NEOPLASM OF UNCERTAIN BEHAVIOR OF LIP, ORAL CAVITY, AND PHARYNX: Primary | ICD-10-CM

## 2024-03-26 DIAGNOSIS — D37.05 NEOPLASM OF UNCERTAIN BEHAVIOR OF LIP, ORAL CAVITY, AND PHARYNX: ICD-10-CM

## 2024-03-26 DIAGNOSIS — D37.09 NEOPLASM OF UNCERTAIN BEHAVIOR OF LIP, ORAL CAVITY, AND PHARYNX: ICD-10-CM

## 2024-03-26 DIAGNOSIS — D37.01 NEOPLASM OF UNCERTAIN BEHAVIOR OF LIP, ORAL CAVITY, AND PHARYNX: ICD-10-CM

## 2024-03-26 DIAGNOSIS — D37.09 NEOPLASM OF UNCERTAIN BEHAVIOR OF LIP, ORAL CAVITY, AND PHARYNX: Primary | ICD-10-CM

## 2024-03-26 PROCEDURE — 71046 X-RAY EXAM CHEST 2 VIEWS: CPT | Mod: TC

## 2024-03-27 ENCOUNTER — ANESTHESIA EVENT (OUTPATIENT)
Dept: SURGERY | Facility: HOSPITAL | Age: 69
End: 2024-03-27
Payer: MEDICARE

## 2024-03-27 RX ORDER — OXYCODONE AND ACETAMINOPHEN 7.5; 3 MG/1; MG/1
1 TABLET ORAL EVERY 4 HOURS PRN
COMMUNITY

## 2024-03-27 RX ORDER — GABAPENTIN 400 MG/1
400 CAPSULE ORAL 3 TIMES DAILY
COMMUNITY

## 2024-03-27 RX ORDER — METHADONE HYDROCHLORIDE 10 MG/1
10 TABLET ORAL EVERY 6 HOURS PRN
COMMUNITY

## 2024-03-28 ENCOUNTER — ANESTHESIA (OUTPATIENT)
Dept: SURGERY | Facility: HOSPITAL | Age: 69
End: 2024-03-28
Payer: MEDICARE

## 2024-03-28 ENCOUNTER — HOSPITAL ENCOUNTER (OUTPATIENT)
Facility: HOSPITAL | Age: 69
Discharge: HOME | End: 2024-03-28
Attending: OTOLARYNGOLOGY | Admitting: OTOLARYNGOLOGY
Payer: MEDICARE

## 2024-03-28 DIAGNOSIS — D37.09 NEOPLASM OF UNCERTAIN BEHAVIOR OF MOUTH: ICD-10-CM

## 2024-03-28 PROCEDURE — 88305 TISSUE EXAM BY PATHOLOGIST: CPT | Performed by: OTOLARYNGOLOGY

## 2024-03-28 PROCEDURE — 25000003 PHARM REV CODE 250: Performed by: OTOLARYNGOLOGY

## 2024-03-28 PROCEDURE — D9220A PRA ANESTHESIA: Mod: ANES,,, | Performed by: ANESTHESIOLOGY

## 2024-03-28 PROCEDURE — D9220A PRA ANESTHESIA: Mod: CRNA,,, | Performed by: NURSE ANESTHETIST, CERTIFIED REGISTERED

## 2024-03-28 PROCEDURE — 25000003 PHARM REV CODE 250: Performed by: NURSE ANESTHETIST, CERTIFIED REGISTERED

## 2024-03-28 PROCEDURE — 36000706: Performed by: OTOLARYNGOLOGY

## 2024-03-28 PROCEDURE — 71000015 HC POSTOP RECOV 1ST HR: Performed by: OTOLARYNGOLOGY

## 2024-03-28 PROCEDURE — 71000033 HC RECOVERY, INTIAL HOUR: Performed by: OTOLARYNGOLOGY

## 2024-03-28 PROCEDURE — 36000707: Performed by: OTOLARYNGOLOGY

## 2024-03-28 PROCEDURE — 88312 SPECIAL STAINS GROUP 1: CPT

## 2024-03-28 PROCEDURE — 37000009 HC ANESTHESIA EA ADD 15 MINS: Performed by: OTOLARYNGOLOGY

## 2024-03-28 PROCEDURE — 37000008 HC ANESTHESIA 1ST 15 MINUTES: Performed by: OTOLARYNGOLOGY

## 2024-03-28 PROCEDURE — 63600175 PHARM REV CODE 636 W HCPCS: Performed by: NURSE ANESTHETIST, CERTIFIED REGISTERED

## 2024-03-28 PROCEDURE — 63600175 PHARM REV CODE 636 W HCPCS: Performed by: OTOLARYNGOLOGY

## 2024-03-28 PROCEDURE — 63600175 PHARM REV CODE 636 W HCPCS: Performed by: ANESTHESIOLOGY

## 2024-03-28 PROCEDURE — 88331 PATH CONSLTJ SURG 1 BLK 1SPC: CPT

## 2024-03-28 PROCEDURE — 71000016 HC POSTOP RECOV ADDL HR: Performed by: OTOLARYNGOLOGY

## 2024-03-28 RX ORDER — CHLORHEXIDINE GLUCONATE ORAL RINSE 1.2 MG/ML
SOLUTION DENTAL
Status: DISCONTINUED | OUTPATIENT
Start: 2024-03-28 | End: 2024-03-28 | Stop reason: HOSPADM

## 2024-03-28 RX ORDER — SODIUM CHLORIDE, SODIUM LACTATE, POTASSIUM CHLORIDE, CALCIUM CHLORIDE 600; 310; 30; 20 MG/100ML; MG/100ML; MG/100ML; MG/100ML
INJECTION, SOLUTION INTRAVENOUS CONTINUOUS
Status: DISCONTINUED | OUTPATIENT
Start: 2024-03-28 | End: 2024-03-28 | Stop reason: HOSPADM

## 2024-03-28 RX ORDER — SODIUM CHLORIDE, SODIUM GLUCONATE, SODIUM ACETATE, POTASSIUM CHLORIDE AND MAGNESIUM CHLORIDE 30; 37; 368; 526; 502 MG/100ML; MG/100ML; MG/100ML; MG/100ML; MG/100ML
INJECTION, SOLUTION INTRAVENOUS CONTINUOUS
Status: CANCELLED | OUTPATIENT
Start: 2024-03-28 | End: 2024-04-27

## 2024-03-28 RX ORDER — FENTANYL CITRATE 50 UG/ML
INJECTION, SOLUTION INTRAMUSCULAR; INTRAVENOUS
Status: DISCONTINUED | OUTPATIENT
Start: 2024-03-28 | End: 2024-03-28

## 2024-03-28 RX ORDER — METHOCARBAMOL 100 MG/ML
1000 INJECTION, SOLUTION INTRAMUSCULAR; INTRAVENOUS ONCE AS NEEDED
Status: COMPLETED | OUTPATIENT
Start: 2024-03-28 | End: 2024-03-28

## 2024-03-28 RX ORDER — LIDOCAINE HYDROCHLORIDE AND EPINEPHRINE 10; 10 MG/ML; UG/ML
INJECTION, SOLUTION INFILTRATION; PERINEURAL
Status: DISCONTINUED
Start: 2024-03-28 | End: 2024-03-28 | Stop reason: HOSPADM

## 2024-03-28 RX ORDER — DEXMEDETOMIDINE HYDROCHLORIDE 100 UG/ML
INJECTION, SOLUTION INTRAVENOUS
Status: DISCONTINUED | OUTPATIENT
Start: 2024-03-28 | End: 2024-03-28

## 2024-03-28 RX ORDER — ONDANSETRON HYDROCHLORIDE 2 MG/ML
4 INJECTION, SOLUTION INTRAVENOUS DAILY PRN
Status: DISCONTINUED | OUTPATIENT
Start: 2024-03-28 | End: 2024-03-28 | Stop reason: HOSPADM

## 2024-03-28 RX ORDER — ROCURONIUM BROMIDE 10 MG/ML
INJECTION, SOLUTION INTRAVENOUS
Status: DISCONTINUED | OUTPATIENT
Start: 2024-03-28 | End: 2024-03-28

## 2024-03-28 RX ORDER — ONDANSETRON HYDROCHLORIDE 2 MG/ML
INJECTION, SOLUTION INTRAMUSCULAR; INTRAVENOUS
Status: DISCONTINUED | OUTPATIENT
Start: 2024-03-28 | End: 2024-03-28

## 2024-03-28 RX ORDER — LIDOCAINE HYDROCHLORIDE 10 MG/ML
INJECTION, SOLUTION EPIDURAL; INFILTRATION; INTRACAUDAL; PERINEURAL
Status: DISCONTINUED | OUTPATIENT
Start: 2024-03-28 | End: 2024-03-28

## 2024-03-28 RX ORDER — DIPHENHYDRAMINE HYDROCHLORIDE 50 MG/ML
25 INJECTION INTRAMUSCULAR; INTRAVENOUS EVERY 6 HOURS PRN
Status: DISCONTINUED | OUTPATIENT
Start: 2024-03-28 | End: 2024-03-28 | Stop reason: HOSPADM

## 2024-03-28 RX ORDER — HYDROMORPHONE HYDROCHLORIDE 2 MG/ML
0.4 INJECTION, SOLUTION INTRAMUSCULAR; INTRAVENOUS; SUBCUTANEOUS EVERY 5 MIN PRN
Status: DISCONTINUED | OUTPATIENT
Start: 2024-03-28 | End: 2024-03-28 | Stop reason: HOSPADM

## 2024-03-28 RX ORDER — LIDOCAINE HYDROCHLORIDE 10 MG/ML
1 INJECTION, SOLUTION EPIDURAL; INFILTRATION; INTRACAUDAL; PERINEURAL ONCE
Status: CANCELLED | OUTPATIENT
Start: 2024-03-28 | End: 2024-03-28

## 2024-03-28 RX ORDER — ACETAMINOPHEN 10 MG/ML
1000 INJECTION, SOLUTION INTRAVENOUS ONCE
Status: DISCONTINUED | OUTPATIENT
Start: 2024-03-28 | End: 2024-03-28 | Stop reason: HOSPADM

## 2024-03-28 RX ORDER — PROPOFOL 10 MG/ML
VIAL (ML) INTRAVENOUS
Status: DISCONTINUED | OUTPATIENT
Start: 2024-03-28 | End: 2024-03-28

## 2024-03-28 RX ORDER — LIDOCAINE HYDROCHLORIDE AND EPINEPHRINE 20; 10 MG/ML; UG/ML
INJECTION, SOLUTION INFILTRATION; PERINEURAL
Status: DISCONTINUED | OUTPATIENT
Start: 2024-03-28 | End: 2024-03-28 | Stop reason: HOSPADM

## 2024-03-28 RX ORDER — DEXAMETHASONE SODIUM PHOSPHATE 4 MG/ML
INJECTION, SOLUTION INTRA-ARTICULAR; INTRALESIONAL; INTRAMUSCULAR; INTRAVENOUS; SOFT TISSUE
Status: DISCONTINUED | OUTPATIENT
Start: 2024-03-28 | End: 2024-03-28

## 2024-03-28 RX ORDER — CHLORHEXIDINE GLUCONATE ORAL RINSE 1.2 MG/ML
SOLUTION DENTAL
Status: DISCONTINUED
Start: 2024-03-28 | End: 2024-03-28 | Stop reason: HOSPADM

## 2024-03-28 RX ORDER — CEFAZOLIN SODIUM 1 G/3ML
2 INJECTION, POWDER, FOR SOLUTION INTRAMUSCULAR; INTRAVENOUS
Status: COMPLETED | OUTPATIENT
Start: 2024-03-28 | End: 2024-03-28

## 2024-03-28 RX ADMIN — METHOCARBAMOL 1000 MG: 100 INJECTION INTRAMUSCULAR; INTRAVENOUS at 01:03

## 2024-03-28 RX ADMIN — FENTANYL CITRATE 50 MCG: 50 INJECTION, SOLUTION INTRAMUSCULAR; INTRAVENOUS at 12:03

## 2024-03-28 RX ADMIN — LIDOCAINE HYDROCHLORIDE 50 MG: 10 INJECTION, SOLUTION EPIDURAL; INFILTRATION; INTRACAUDAL; PERINEURAL at 11:03

## 2024-03-28 RX ADMIN — DEXMEDETOMIDINE 8 MCG: 200 INJECTION, SOLUTION INTRAVENOUS at 11:03

## 2024-03-28 RX ADMIN — DEXAMETHASONE SODIUM PHOSPHATE 12 MG: 4 INJECTION, SOLUTION INTRA-ARTICULAR; INTRALESIONAL; INTRAMUSCULAR; INTRAVENOUS; SOFT TISSUE at 12:03

## 2024-03-28 RX ADMIN — SODIUM CHLORIDE, POTASSIUM CHLORIDE, SODIUM LACTATE AND CALCIUM CHLORIDE: 600; 310; 30; 20 INJECTION, SOLUTION INTRAVENOUS at 11:03

## 2024-03-28 RX ADMIN — DEXMEDETOMIDINE 4 MCG: 200 INJECTION, SOLUTION INTRAVENOUS at 12:03

## 2024-03-28 RX ADMIN — SUGAMMADEX 400 MG: 100 INJECTION, SOLUTION INTRAVENOUS at 12:03

## 2024-03-28 RX ADMIN — CEFAZOLIN 2 G: 330 INJECTION, POWDER, FOR SOLUTION INTRAMUSCULAR; INTRAVENOUS at 12:03

## 2024-03-28 RX ADMIN — FENTANYL CITRATE 50 MCG: 50 INJECTION, SOLUTION INTRAMUSCULAR; INTRAVENOUS at 11:03

## 2024-03-28 RX ADMIN — ONDANSETRON 4 MG: 2 INJECTION INTRAMUSCULAR; INTRAVENOUS at 12:03

## 2024-03-28 RX ADMIN — ROCURONIUM BROMIDE 50 MG: 50 INJECTION INTRAVENOUS at 11:03

## 2024-03-28 RX ADMIN — PROPOFOL 150 MG: 10 INJECTION, EMULSION INTRAVENOUS at 11:03

## 2024-03-28 NOTE — PLAN OF CARE
Pt is doty9-ufi-buohaxo score 9/10-will continue o2 in phase2 til ready to wean-he is able to rest comfortably-he meets criteria for phase2 care per dr schwartz-to rm 1 via bed with cam

## 2024-03-28 NOTE — OP NOTE
OPERATIVE REPORT    DATE: 3/28/24     SURGEON:  Garo Nash Jr, MD     PROCEDURE PERFORMED:    Left partial glossectomy    PREOPERATIVE DIAGNOSIS:   Left oral tongue ulcer     INDICATIONS:  Evaluation and treatment.     COMPLICATIONS:  None.     BLOOD LOSS:  Minimal.    DETAILS:  The patient was brought to the operative suite and placed in supine position. Anesthesia administered general anesthesia with endotracheal intubation. The patient was then prepped and draped in standard fashion. Mouth gag was placed in the patient's oral cavity. Tongue was retracted.  Ulceration was identified and excised with a margin.  Frozen pathology did not reveal malignancy.  Chromic sutures were used to reapproximate the tissue. Stomach contents were suctioned.  Mouth gag was removed and patient was awakened from anesthesia.

## 2024-03-28 NOTE — PROGRESS NOTES
Pt tolerating fluids well. He states he is ready to go home. Pt was encouraged to perform IS at home which he stated he has at home.

## 2024-03-28 NOTE — TRANSFER OF CARE
"Anesthesia Transfer of Care Note    Patient: Reid Huddleston    Procedure(s) Performed: Procedure(s) (LRB):  GLOSSECTOMY, PARTIAL  Left  // Frozen section  BIOPSY (Left)    Patient location: PACU    Anesthesia Type: general    Transport from OR: Transported from OR on room air with adequate spontaneous ventilation    Post pain: adequate analgesia    Post assessment: no apparent anesthetic complications    Post vital signs: stable    Level of consciousness: awake    Nausea/Vomiting: no nausea/vomiting    Complications: none    Transfer of care protocol was followed      Last vitals: Visit Vitals  BP (!) 172/97   Pulse 99   Temp 36.3 °C (97.3 °F)   Resp 20   Ht 5' 4" (1.626 m)   Wt (!) 141 kg (310 lb 13.6 oz)   SpO2 96%   BMI 53.36 kg/m²     "

## 2024-03-28 NOTE — ANESTHESIA PROCEDURE NOTES
Intubation    Date/Time: 3/28/2024 12:00 PM    Performed by: Lisa Jones CRNA  Authorized by: Abimael Neri MD    Intubation:     Induction:  Intravenous    Intubated:  Postinduction    Mask Ventilation:  Easy mask    Attempts:  1    Attempted By:  CRNA    Method of Intubation:  Direct    Blade:  Keating 2    Laryngeal View Grade: Grade I - full view of cords      Difficult Airway Encountered?: No      Complications:  None    Airway Device:  Oral endotracheal tube    Airway Device Size:  7.0    Style/Cuff Inflation:  Cuffed (inflated to minimal occlusive pressure)    Tube secured:  20    Secured at:  The lips    Placement Verified By:  Capnometry    Complicating Factors:  None    Findings Post-Intubation:  BS equal bilateral and atraumatic/condition of teeth unchanged

## 2024-03-28 NOTE — H&P
HISTORY AND PHYSICAL     PREOPERATIVE DIAGNOSIS:  L tongue lesion     HISTORY OF PRESENT ILLNESS:  Patient presents to OR for procedure.    Past Medical History:   Diagnosis Date    AC (acromioclavicular) joint bone spurs     bilateral shoulders    Arthritis     Chronic back pain     radiates into both legs and both feet    Depression     Hypothyroidism, unspecified     Mitral regurgitation     Neuropathy     Obesity, unspecified     Severe scoliosis     Spasm of muscle of lower back     Tricuspid regurgitation        Past Surgical History:   Procedure Laterality Date    BACK SURGERY      x2    HIP SURGERY Left     KIDNEY STONE SURGERY      removal    LAPAROSCOPIC SLEEVE GASTRECTOMY  03/2017    LUMBAR FUSION  1982    L4-S1    LUMBAR FUSION  12/2014    Dr. Nicole    SHOULDER SURGERY Right     Dr Villagomez    SPINAL CORD STIMULATOR IMPLANT  12/2004    SPINAL CORD STIMULATOR REMOVAL  12/2010    Dr. Suarez    TONSILLECTOMY AND ADENOIDECTOMY      TRIAL OF SPINAL CORD NERVE STIMULATOR  12/2013    attempted but unable to get into the epidural space due to severe scoliosis and bone spurs       History reviewed. No pertinent family history.    Social History     Socioeconomic History    Marital status:    Tobacco Use    Smoking status: Never    Smokeless tobacco: Never   Substance and Sexual Activity    Alcohol use: Never    Drug use: Never    Sexual activity: Yes     Partners: Female       No current facility-administered medications for this encounter.     Current Outpatient Medications   Medication Sig Dispense Refill    gabapentin (NEURONTIN) 400 MG capsule Take 400 mg by mouth 3 (three) times daily.      methadone (DOLOPHINE) 10 MG tablet Take 10 mg by mouth every 6 (six) hours as needed for Pain.      oxyCODONE-acetaminophen (LYNOX) 7.5-300 mg per tablet Take 1 tablet by mouth every 4 (four) hours as needed for Pain.         Review of patient's allergies indicates:   Allergen Reactions    Opioids - morphine  analogues Itching        REVIEW OF SYSTEMS: Non contributory    PHYSICAL EXAMINATION:  GENERAL:  Well-appearing.  ENT:  Unchanged from previous exam  CARDIOVASCULAR:  Well perfused.  PULMONARY:  Satting well on room air.     ASSESSMENT/PLAN:  Proceed to OR as previously scheduled.           ______________________________  Garo Nash Jr, MD

## 2024-03-28 NOTE — PROGRESS NOTES
Dr Fritz notified of pts recent readings of 02 sats 84%, and 88%. He was also aware of admission 02 sat 85% and that pt stated he does not get above 90% at home. Dr Fritz gave orders that pt may go home.

## 2024-03-28 NOTE — DISCHARGE SUMMARY
DISCHARGE SUMMARY     ADMISSION DATE: 3/28/24     ADMITTING DIAGNOSIS:  L Tongue Ulcer    DISCHARGE DATE: 3/28/24     DISCHARGE DIAGNOSIS:  Same     PROCEDURE RESULTS:  Successful surgery without complication.     DISPOSITION:  Home with self-care.     DISCHARGE CONDITION:  Stable.     DISCHARGE INSTRUCTIONS:  Please find provided discharge instructions in   home-going patient folder.  Follow up 2 weeks in clinic.           ______________________________  Garo Nash Jr, MD

## 2024-03-28 NOTE — ANESTHESIA PREPROCEDURE EVALUATION
03/28/2024  Reid Huddleston is a 68 y.o., male with multiple complicated medical problems.  He has a left lateral tongue ulcer that has been surgically addressed in the recent past, and he is here for further resection today.  He has been seen and optimized for this procedure by cardiology.  LV EF is WNL. He has chronic SOB noted when ambulating and lying supine.  He attributes this to his super morbid obesity.  Hx of multiple back surgeries and chronic pain.  Hx methadone.  He states that he is at his usual baseline.      Pre-op Assessment          Review of Systems      Physical Exam  General: Well nourished, Cooperative, Alert and Oriented    Airway:  Mallampati: III   Mouth Opening: Normal  TM Distance: Normal  Tongue: Normal  Neck ROM: Normal ROM  Neck: Girth Increased    Dental:  Intact    Heart:  Rate: Normal  Rhythm: Regular Rhythm        Anesthesia Plan  Type of Anesthesia, risks & benefits discussed:    Anesthesia Type: Gen ETT  Intra-op Monitoring Plan: Standard ASA Monitors  Post Op Pain Control Plan: multimodal analgesia and IV/PO Opioids PRN  Induction:  IV  Airway Plan: Direct, Post-Induction  Informed Consent: Informed consent signed with the Patient and all parties understand the risks and agree with anesthesia plan.  All questions answered.   ASA Score: 4  Day of Surgery Review of History & Physical: H&P Update referred to the surgeon/provider.  Anesthesia Plan Notes: Small endo tube, taped to the right.    Ready For Surgery From Anesthesia Perspective.     .

## 2024-03-28 NOTE — PROGRESS NOTES
"Cannula no longer in nose. Pt reports it was itching him so he moved it. Pt requested to get up to chair. He also stated that his 02 sat rarely gets above 90% on room air at home. "I have an appt with a doctor to see why."  "

## 2024-03-31 VITALS
SYSTOLIC BLOOD PRESSURE: 138 MMHG | DIASTOLIC BLOOD PRESSURE: 90 MMHG | OXYGEN SATURATION: 88 % | BODY MASS INDEX: 53.07 KG/M2 | HEIGHT: 64 IN | HEART RATE: 95 BPM | WEIGHT: 310.88 LBS | RESPIRATION RATE: 18 BRPM | TEMPERATURE: 98 F

## 2024-03-31 NOTE — ANESTHESIA POSTPROCEDURE EVALUATION
Anesthesia Post Evaluation    Patient: Reid Huddleston    Procedure(s) Performed: Procedure(s) (LRB):  GLOSSECTOMY, PARTIAL  Left  // Frozen section  BIOPSY (Left)    Final Anesthesia Type: general      Patient location during evaluation: PACU  Patient participation: Yes- Able to Participate  Level of consciousness: awake and alert  Post-procedure vital signs: reviewed and stable  Pain management: adequate  Airway patency: patent      Anesthetic complications: no      Cardiovascular status: blood pressure returned to baseline  Respiratory status: unassisted  Hydration status: euvolemic  Follow-up not needed.              Vitals Value Taken Time   /88 03/28/24 1540   Temp 36.5 °C (97.7 °F) 03/28/24 1340   Pulse 95 03/28/24 1540   Resp 18 03/28/24 1540   SpO2 88 % 03/28/24 1640         Event Time   Out of Recovery 13:30:00         Pain/Quirino Score: No data recorded

## 2024-04-03 LAB — PSYCHE PATHOLOGY RESULT: NORMAL

## (undated) DEVICE — DRESSING TELFA N ADH 3X8

## (undated) DEVICE — SUT SILK 2.0 BLK 18

## (undated) DEVICE — PENCIL ELECSURG ROCKER 15FT

## (undated) DEVICE — SOL NACL IRR 1000ML BTL

## (undated) DEVICE — SUT VICRYL PLUS 4-0 FS-2 27IN

## (undated) DEVICE — SYR 3ML LL 18GA 1.5IN

## (undated) DEVICE — GLOVE SIGNATURE MICRO LTX 7

## (undated) DEVICE — BLADE SURG STAINLESS STEEL #15

## (undated) DEVICE — ELECTRODE PATIENT RETURN DISP

## (undated) DEVICE — TUBE SUCTION MEDI-VAC STERILE

## (undated) DEVICE — NDL 27G X 1 1/4

## (undated) DEVICE — Device